# Patient Record
Sex: MALE | Race: WHITE | Employment: FULL TIME | URBAN - NONMETROPOLITAN AREA
[De-identification: names, ages, dates, MRNs, and addresses within clinical notes are randomized per-mention and may not be internally consistent; named-entity substitution may affect disease eponyms.]

---

## 2021-10-26 ENCOUNTER — TELEPHONE (OUTPATIENT)
Dept: CARDIOLOGY CLINIC | Age: 51
End: 2021-10-26

## 2021-10-26 NOTE — TELEPHONE ENCOUNTER
Received a referral for Dr. Juliana Rosario for hypertriglyceridemia and HTN. LM for patient to call our office back. He is from [place].

## 2021-11-02 NOTE — TELEPHONE ENCOUNTER
Spoke with pt he states he does not want this appt, he is not interested. Left message on [place] also.

## 2022-03-23 RX ORDER — LISINOPRIL 20 MG/1
20 TABLET ORAL DAILY
COMMUNITY

## 2022-03-23 RX ORDER — ASPIRIN 81 MG/1
81 TABLET, CHEWABLE ORAL DAILY
COMMUNITY

## 2022-03-30 ENCOUNTER — ANESTHESIA EVENT (OUTPATIENT)
Dept: OPERATING ROOM | Age: 52
End: 2022-03-30
Payer: COMMERCIAL

## 2022-03-30 ENCOUNTER — HOSPITAL ENCOUNTER (OUTPATIENT)
Age: 52
Setting detail: OUTPATIENT SURGERY
Discharge: HOME | End: 2022-03-30
Attending: SPECIALIST | Admitting: SPECIALIST
Payer: COMMERCIAL

## 2022-03-30 ENCOUNTER — ANESTHESIA (OUTPATIENT)
Dept: OPERATING ROOM | Age: 52
End: 2022-03-30
Payer: COMMERCIAL

## 2022-03-30 VITALS
RESPIRATION RATE: 18 BRPM | DIASTOLIC BLOOD PRESSURE: 69 MMHG | SYSTOLIC BLOOD PRESSURE: 116 MMHG | OXYGEN SATURATION: 100 %

## 2022-03-30 VITALS
HEIGHT: 73 IN | TEMPERATURE: 97.1 F | HEART RATE: 84 BPM | DIASTOLIC BLOOD PRESSURE: 83 MMHG | RESPIRATION RATE: 20 BRPM | WEIGHT: 175 LBS | OXYGEN SATURATION: 96 % | SYSTOLIC BLOOD PRESSURE: 132 MMHG | BODY MASS INDEX: 23.19 KG/M2

## 2022-03-30 PROCEDURE — 2580000003 HC RX 258: Performed by: SPECIALIST

## 2022-03-30 PROCEDURE — 2500000003 HC RX 250 WO HCPCS: Performed by: SPECIALIST

## 2022-03-30 PROCEDURE — 2709999900 HC NON-CHARGEABLE SUPPLY: Performed by: SPECIALIST

## 2022-03-30 PROCEDURE — 3700000001 HC ADD 15 MINUTES (ANESTHESIA): Performed by: SPECIALIST

## 2022-03-30 PROCEDURE — 3600000012 HC SURGERY LEVEL 2 ADDTL 15MIN: Performed by: SPECIALIST

## 2022-03-30 PROCEDURE — 7100000011 HC PHASE II RECOVERY - ADDTL 15 MIN: Performed by: SPECIALIST

## 2022-03-30 PROCEDURE — 6360000002 HC RX W HCPCS: Performed by: NURSE ANESTHETIST, CERTIFIED REGISTERED

## 2022-03-30 PROCEDURE — 6360000002 HC RX W HCPCS: Performed by: SPECIALIST

## 2022-03-30 PROCEDURE — 3600000002 HC SURGERY LEVEL 2 BASE: Performed by: SPECIALIST

## 2022-03-30 PROCEDURE — 7100000010 HC PHASE II RECOVERY - FIRST 15 MIN: Performed by: SPECIALIST

## 2022-03-30 PROCEDURE — 3700000000 HC ANESTHESIA ATTENDED CARE: Performed by: SPECIALIST

## 2022-03-30 RX ORDER — SODIUM CHLORIDE 9 MG/ML
INJECTION, SOLUTION INTRAVENOUS CONTINUOUS
Status: DISCONTINUED | OUTPATIENT
Start: 2022-03-30 | End: 2022-03-30 | Stop reason: HOSPADM

## 2022-03-30 RX ORDER — LIDOCAINE HYDROCHLORIDE AND EPINEPHRINE 10; 10 MG/ML; UG/ML
INJECTION, SOLUTION INFILTRATION; PERINEURAL PRN
Status: DISCONTINUED | OUTPATIENT
Start: 2022-03-30 | End: 2022-03-30 | Stop reason: ALTCHOICE

## 2022-03-30 RX ORDER — PROPOFOL 10 MG/ML
INJECTION, EMULSION INTRAVENOUS PRN
Status: DISCONTINUED | OUTPATIENT
Start: 2022-03-30 | End: 2022-03-30 | Stop reason: SDUPTHER

## 2022-03-30 RX ADMIN — Medication 2000 MG: at 12:53

## 2022-03-30 RX ADMIN — PROPOFOL 40 MG: 10 INJECTION, EMULSION INTRAVENOUS at 13:07

## 2022-03-30 RX ADMIN — PROPOFOL 40 MG: 10 INJECTION, EMULSION INTRAVENOUS at 13:14

## 2022-03-30 RX ADMIN — PROPOFOL 40 MG: 10 INJECTION, EMULSION INTRAVENOUS at 13:09

## 2022-03-30 RX ADMIN — PROPOFOL 40 MG: 10 INJECTION, EMULSION INTRAVENOUS at 12:54

## 2022-03-30 RX ADMIN — PROPOFOL 40 MG: 10 INJECTION, EMULSION INTRAVENOUS at 13:15

## 2022-03-30 RX ADMIN — PROPOFOL 40 MG: 10 INJECTION, EMULSION INTRAVENOUS at 12:57

## 2022-03-30 RX ADMIN — PROPOFOL 40 MG: 10 INJECTION, EMULSION INTRAVENOUS at 12:52

## 2022-03-30 RX ADMIN — PROPOFOL 40 MG: 10 INJECTION, EMULSION INTRAVENOUS at 13:12

## 2022-03-30 RX ADMIN — SODIUM CHLORIDE: 9 INJECTION, SOLUTION INTRAVENOUS at 12:49

## 2022-03-30 ASSESSMENT — PULMONARY FUNCTION TESTS
PIF_VALUE: 0

## 2022-03-30 ASSESSMENT — PAIN SCALES - GENERAL: PAINLEVEL_OUTOF10: 0

## 2022-03-30 ASSESSMENT — PAIN - FUNCTIONAL ASSESSMENT: PAIN_FUNCTIONAL_ASSESSMENT: 0-10

## 2022-03-30 NOTE — PROGRESS NOTES
SKIN GLUE RIGHT UPPER ARM     COTTON BALL, XEROFORM, BACITRACIN OINTMENT, 4X4 GAUZE, KERLIX,  ABD , ACE WRAP TO RIGHT LOWER ARM.     XEROFORM, BACITRACIN, 4X4 GAUZE [address] [facility] AND ACE TO HEAD AREA

## 2022-03-30 NOTE — ANESTHESIA PRE PROCEDURE
Department of Anesthesiology  Preprocedure Note       Name:  Catrina Sifuentes   Age:  46 y.o.  :  1970                                          MRN:  [id number]         Date:  3/30/2022      Surgeon: James Gusman):  Swapnil Ambrose MD    Procedure: Procedure(s):  MOHS REPAIR SCC MID UPPER SCALP AND SCC RIGHT DORSAL HAND    Medications prior to admission:   Prior to Admission medications    Medication Sig Start Date End Date Taking? Authorizing Provider   lisinopril (PRINIVIL;ZESTRIL) 20 MG tablet Take 20 mg by mouth daily   Yes Historical Provider, MD   aspirin 81 MG chewable tablet Take 81 mg by mouth daily   Yes Historical Provider, MD   Multiple Vitamin (MULTIVITAMIN ADULT PO) Take by mouth   Yes Historical Provider, MD       Current medications:    Current Facility-Administered Medications   Medication Dose Route Frequency Provider Last Rate Last Admin    0.9 % sodium chloride infusion   IntraVENous Continuous Swapnil Ambrose MD           Allergies:  No Known Allergies    Problem List:  There is no problem list on file for this patient.       Past Medical History:        Diagnosis Date    Cancer ([facility] [address]     skin    Hypertension     Testicular [place] ([facility] [place] [facility]        Past Surgical History:        Procedure Laterality Date    SKIN CANCER EXCISION      TESTICLE REMOVAL         Social History:    Social History     Tobacco Use    Smoking status: Never Smoker    Smokeless tobacco: Never Used   Substance Use Topics    Alcohol use: Not Currently                                Counseling given: Not Answered      Vital Signs (Current):   Vitals:    22 1356 22 1151   BP:  (!) 137/92   Pulse:  77   Resp:  16   Temp:  96.9 °F (36.1 °C)   TempSrc:  Temporal   SpO2:  99%   Weight: 175 lb (79.4 kg) 175 lb (79.4 kg)   Height: 6' 1\" (1.854 m) 6' 1\" (1.854 m)                                              BP Readings from Last 3 Encounters:   22 (!) 137/92   22 135/86       NPO Status: Time of last liquid consumption: 1130                        Time of last solid consumption: 1900                        Date of last liquid consumption: 03/30/22                        Date of last solid food consumption: 03/29/22    BMI:   Wt Readings from Last 3 Encounters:   03/30/22 175 lb (79.4 kg)     Body mass index is 23.09 kg/m². CBC: No results found for: WBC, RBC, HGB, HCT, MCV, RDW, PLT    CMP: No results found for: NA, K, CL, CO2, BUN, CREATININE, GFRAA, AGRATIO, LABGLOM, GLUCOSE, GLU, PROT, CALCIUM, BILITOT, ALKPHOS, AST, ALT    POC Tests: No results for input(s): POCGLU, POCNA, POCK, POCCL, POCBUN, POCHEMO, POCHCT in the last 72 hours. Coags: No results found for: PROTIME, INR, APTT    HCG (If Applicable): No results found for: PREGTESTUR, PREGSERUM, HCG, HCGQUANT     ABGs: No results found for: PHART, PO2ART, NRB6GMO, YQT4LJP, BEART, U4VQAJTT     Type & Screen (If Applicable):  No results found for: LABABO, LABRH    Drug/Infectious Status (If Applicable):  No results found for: HIV, HEPCAB    COVID-19 Screening (If Applicable): No results found for: COVID19        Anesthesia Evaluation  Patient summary reviewed and Nursing notes reviewed no history of anesthetic complications:   Airway: Mallampati: II  TM distance: >3 FB   Neck ROM: full  Mouth opening: > = 3 FB Dental:          Pulmonary:Negative Pulmonary ROS and normal exam  breath sounds clear to auscultation                             Cardiovascular:  Exercise tolerance: good (>4 METS),   (+) hypertension:,                   Neuro/Psych:   Negative Neuro/Psych ROS              GI/Hepatic/Renal: Neg GI/Hepatic/Renal ROS            Endo/Other:    (+) malignancy/cancer. Pt had no PAT visit       Abdominal:             Vascular: negative vascular ROS. Other Findings:             Anesthesia Plan      MAC     ASA 3       Induction: intravenous. Anesthetic plan and risks discussed with patient and spouse.       Plan discussed with Ysitie 30, DO   3/30/2022

## 2022-03-30 NOTE — ANESTHESIA POSTPROCEDURE EVALUATION
Department of Anesthesiology  Postprocedure Note    Patient: Elvis Lombardo  MRN: [id number]  YOB: 1970  Date of evaluation: 3/30/2022  Time:  2:26 PM     Procedure Summary     Date: 03/30/22 Room / Location: [address] [place] [address] 04 / [address]    Anesthesia Start: 3713 Anesthesia Stop: 8058    Procedure: MOHS REPAIR SCC MID UPPER SCALP AND SCC RIGHT DORSAL HAND WITH FTSG FROM FROM RIGHT UPPER AXILLA (N/A Face) Diagnosis: (SCC MID UPPER SCALP AND SCC RIGHT DORSAL HAND)    Surgeons: Peggy Sacks, MD Responsible Provider: Rosario Rowe DO    Anesthesia Type: MAC ASA Status: 3          Anesthesia Type: MAC    Rayshawn Phase I:      Rayshawn Phase II: Rayshawn Score: 10    Last vitals: Reviewed and per EMR flowsheets.        Anesthesia Post Evaluation    Patient location during evaluation: bedside  Patient participation: complete - patient participated  Level of consciousness: awake and alert  Pain score: 0  Airway patency: patent  Nausea & Vomiting: no nausea and no vomiting  Complications: no  Cardiovascular status: hemodynamically stable and blood pressure returned to baseline  Respiratory status: spontaneous ventilation, room air and acceptable  Hydration status: stable

## 2022-03-30 NOTE — H&P
[facility]  History and Physical Update    Pt Name: Cherise Carmichael  MRN: [id number]  YOB: 1970  Date of evaluation: 3/30/2022    I have examined the patient and reviewed the H&P/Consult and there are no changes to the patient or plans.       Nancy Hayes MD  Electronically signed 3/30/2022 at 6:58 AM

## 2022-03-30 NOTE — PROGRESS NOTES
1340  Pt. Awake and oriented on arrival to PACU. Pt. Denies pain. Wife in room. 1346  Pt. Eating and drinking without difficulty. Pt. Denies nausea. 1400  IV discontinued. Pt. Getting dressed with wife assisting. 1413  Discharge instructions given. Pt. Shira Manuel understanding. 1415  pacu criteria met. Pt. Discharged. Pt. Ambulating with R.N.  Pt. Stable.

## 2022-03-30 NOTE — OP NOTE
Operative Note    Patient name: Alberto Alberts             Medical Record Number: [id number]    Primary Care Physician: Rosibel Samuels MD     1970    Date of Procedure: 3/30/2022    Pre-operative Diagnosis:  1.  4cm2 defect of right dorsal hand s/p MOHS for squamous cell carcinoma       2.  3cm2 defect of mid upper scalp s/p MOHS for squamous cell carcinoma    Post-operative Diagnosis: Same    Procedure Performed:  1. Full thickness skin graft (4 cm2) repair of right dorsal hand defect (CPT 21903)       2. Repair of 3cm2 mid upper scalp with an adjacent tissue transfer (18cm2) (CPT 65853)    Surgeons/Assistants: MD Abigail Goldberg PA-C    Estimated Blood Loss: 5ml     Complications: none immediately appreciated    Procedure: With the patient lying in the supine position and under adequate anesthesia per the anesthesia team.   Local anesthesia consisting of 19 ml of 1% Lidocaine 1:100,000 with epinephrine solution of the donor site of the proximal medial right arm as well as the scalp and hand defects. The area was prepped and draped in the standard surgical fashion. The patient has very thin skin and a 4cm2 defect of dorsal right hand which could not be closed primarily, therefore a 4cm2 full thickness skin graft was taken. It was defatted and secured in position with a 3-0 silk suture tie and then a 5-0 fast absorbable suture was placed in a simple running fashion. A Bacitracin Xeroform and moist cotton ball tie over bolster was secured using the tails of the silk sutures. The donor site was closed with a 3-0 Monocryl suture placed in interrupted buried fashion and then Histoacryl respectively. Regarding the scalp 3cm2 defect that could not be closed primarily due to tension, therefore a 18cm2 sum of defect/adjacent tissue transfer (rotation flap) was designed, back cut 5cm, undermined/elevated 3cm, and inset with 4-0 Monocryl suture.   Kayla's triangles were removed with final closure being 4-0 chromic placed in simple interrupted fashion with bacitracin and dry dressing applied as final covering. The patient tolerated the procedure well and remained hemodynamically stable throughout the procedure and was quite comfortable throughout the operative course. Clinical staging for cancer cases:  Ino Sepulveda MD  Electronically signed by me on 3/30/2022 at 1:31 PM  Operative Note      Patient: Luis Wu  YOB: 1970  MRN: [id number]    Date of Procedure: 3/30/2022    Pre-Op Diagnosis: SCC MID UPPER SCALP AND SCC RIGHT DORSAL HAND    Post-Op Diagnosis: Same       Procedure(s):  MOHS REPAIR SCC MID UPPER SCALP AND SCC RIGHT DORSAL HAND WITH FTSG FROM FROM RIGHT UPPER AXILLA    Surgeon(s):  Vaibhav Shanks MD    Assistant:   Physician Assistant: Javier Flannery PA-C    Anesthesia: Monitor Anesthesia Care    Estimated Blood Loss (mL): Minimal    Complications: None    Specimens:   * No specimens in log *    Implants:  * No implants in log *      Drains: * No LDAs found *    Findings: 1.  4cm2 defect of right dorsal hand s/p MOHS for squamous cell carcinoma       2.  3cm2 defect of mid upper scalp s/p MOHS for squamous cell carcinoma    Detailed Description of Procedure:   1. Full thickness skin graft (4 cm2) repair of right dorsal hand defect (CPT 59292)       2.   Repair of 3cm2 mid upper scalp with an adjacent tissue transfer (18cm2) (CPT 58908)    Electronically signed by Vaibhav Shanks MD on 3/30/2022 at 1:31 PM

## (undated) DEVICE — SUTURE MCRYL SZ 4-0 L18IN ABSRB UD P-3 L13MM 3/8 CIR PRIM Y494G

## (undated) DEVICE — BANDAGE,GAUZE,4.5"X4.1YD,STERILE,LF: Brand: MEDLINE

## (undated) DEVICE — SUTURE NONABSORBABLE BRAIDED 4-0 SH 30 IN BLK PERMA HND K831H

## (undated) DEVICE — GOWN,SIRUS,NON REINFRCD,LARGE,SET IN SL: Brand: MEDLINE

## (undated) DEVICE — COTTON BALL ST

## (undated) DEVICE — GLOVE ORANGE PI 7   MSG9070

## (undated) DEVICE — PACK PROCEDURE SURG PLAS SC MIN SRHP LF

## (undated) DEVICE — GLOVE SURG SZ 8 L11.77IN FNGR THK9.8MIL STRW LTX POLYMER